# Patient Record
Sex: FEMALE | Race: WHITE | Employment: FULL TIME | URBAN - METROPOLITAN AREA
[De-identification: names, ages, dates, MRNs, and addresses within clinical notes are randomized per-mention and may not be internally consistent; named-entity substitution may affect disease eponyms.]

---

## 2017-10-16 ENCOUNTER — TELEPHONE (OUTPATIENT)
Dept: RHEUMATOLOGY | Facility: CLINIC | Age: 22
End: 2017-10-16

## 2017-10-16 NOTE — TELEPHONE ENCOUNTER
Called patient at [phone] Left a message X1 asking patient to call back regarding the referral we received. An intake form needs to be completed over the phone and records are needed from outside facilities. Awaiting a call back from the patient.  Lisa Montoya CMA  10/16/2017 2:38 PM

## 2017-10-16 NOTE — TELEPHONE ENCOUNTER
----- Message from Alanis Morgan sent at 9/29/2017  3:46 PM CDT -----  Regarding: possible new pt   Contact: [phone]  Pt called in as her provider faxed in referral and records for pt, the provider cannot find out what is going on with pt so she was referred to [facility]. The provider is Skye Richards @ [facility]     Please contact pt @ [phone] once all records are reviewed     Thank you!    Alanis Morgan  Intake representative   Call Center

## 2017-10-17 NOTE — TELEPHONE ENCOUNTER
Emailed release of information form to patient at [email or web address].  Lisa Montoya CMA  10/17/2017 2:39 PM

## 2017-10-17 NOTE — TELEPHONE ENCOUNTER
General Rheumatology Intake Form    1. What is the reason that you are referred to our clinic? Rash on joints from the sun of heat    2. What is the name of the doctor and clinic that referred you to us? Skye Richards at [facility]    3. Have you seen a Rheumatologist in the past?  yes  If yes, what is the name of doctor and clinic? [facility]    4. Have you been diagnosed with Fibromyalgia? No     4. Who is your primary doctor/primary clinic? none    5. Who manages your care for this issue now? none     6. Have you had any labs or imaging done that pertain to the reason that you are coming here? yes If yes, where? [facility]    7. Have you seen any specialist related to the reason you are coming here? yes     8. Where are we expecting records from? [facility]  Lisa Montoya CMA  10/17/2017 2:37 PM

## 2017-11-02 NOTE — TELEPHONE ENCOUNTER
Called patient at [phone] and left a message asking assistance with obtaining her records.  Lisa Montoya CMA  11/2/2017 9:10 AM

## 2017-11-03 NOTE — TELEPHONE ENCOUNTER
Records received and chart is ready for review. Review process takes 1-3 weeks. When we are notified of the decision of the Rheumatologist the patient will be notified.   Lisa Montoya CMA  11/3/2017 3:45 PM

## 2024-01-16 ENCOUNTER — ANESTHESIA EVENT (OUTPATIENT)
Dept: OBGYN | Facility: CLINIC | Age: 29
End: 2024-01-16
Payer: COMMERCIAL

## 2024-01-16 ENCOUNTER — ANESTHESIA (OUTPATIENT)
Dept: OBGYN | Facility: CLINIC | Age: 29
End: 2024-01-16
Payer: COMMERCIAL

## 2024-01-16 ENCOUNTER — HOSPITAL ENCOUNTER (INPATIENT)
Facility: CLINIC | Age: 29
LOS: 1 days | Discharge: HOME HEALTH | End: 2024-01-17
Attending: OBSTETRICS & GYNECOLOGY | Admitting: OBSTETRICS & GYNECOLOGY
Payer: COMMERCIAL

## 2024-01-16 LAB
ABO/RH(D): NORMAL
ANTIBODY SCREEN: NEGATIVE
HGB BLD-MCNC: 12.8 G/DL (ref 11.7–15.7)
SPECIMEN EXPIRATION DATE: NORMAL
T PALLIDUM AB SER QL: NONREACTIVE

## 2024-01-16 PROCEDURE — 370N000003 HC ANESTHESIA WARD SERVICE: Performed by: ANESTHESIOLOGY

## 2024-01-16 PROCEDURE — 722N000001 HC LABOR CARE VAGINAL DELIVERY SINGLE

## 2024-01-16 PROCEDURE — 85018 HEMOGLOBIN: CPT | Performed by: OBSTETRICS & GYNECOLOGY

## 2024-01-16 PROCEDURE — 86780 TREPONEMA PALLIDUM: CPT | Performed by: OBSTETRICS & GYNECOLOGY

## 2024-01-16 PROCEDURE — 10907ZC DRAINAGE OF AMNIOTIC FLUID, THERAPEUTIC FROM PRODUCTS OF CONCEPTION, VIA NATURAL OR ARTIFICIAL OPENING: ICD-10-PCS | Performed by: OBSTETRICS & GYNECOLOGY

## 2024-01-16 PROCEDURE — 3E0R3BZ INTRODUCTION OF ANESTHETIC AGENT INTO SPINAL CANAL, PERCUTANEOUS APPROACH: ICD-10-PCS | Performed by: OBSTETRICS & GYNECOLOGY

## 2024-01-16 PROCEDURE — 0HQ9XZZ REPAIR PERINEUM SKIN, EXTERNAL APPROACH: ICD-10-PCS | Performed by: OBSTETRICS & GYNECOLOGY

## 2024-01-16 PROCEDURE — 250N000011 HC RX IP 250 OP 636: Performed by: ANESTHESIOLOGY

## 2024-01-16 PROCEDURE — 250N000011 HC RX IP 250 OP 636

## 2024-01-16 PROCEDURE — 36415 COLL VENOUS BLD VENIPUNCTURE: CPT | Performed by: OBSTETRICS & GYNECOLOGY

## 2024-01-16 PROCEDURE — 250N000009 HC RX 250: Performed by: OBSTETRICS & GYNECOLOGY

## 2024-01-16 PROCEDURE — 250N000011 HC RX IP 250 OP 636: Performed by: OBSTETRICS & GYNECOLOGY

## 2024-01-16 PROCEDURE — 120N000001 HC R&B MED SURG/OB

## 2024-01-16 PROCEDURE — 00HU33Z INSERTION OF INFUSION DEVICE INTO SPINAL CANAL, PERCUTANEOUS APPROACH: ICD-10-PCS | Performed by: OBSTETRICS & GYNECOLOGY

## 2024-01-16 PROCEDURE — 86900 BLOOD TYPING SEROLOGIC ABO: CPT | Performed by: OBSTETRICS & GYNECOLOGY

## 2024-01-16 PROCEDURE — 258N000003 HC RX IP 258 OP 636: Performed by: OBSTETRICS & GYNECOLOGY

## 2024-01-16 PROCEDURE — 250N000013 HC RX MED GY IP 250 OP 250 PS 637: Performed by: OBSTETRICS & GYNECOLOGY

## 2024-01-16 RX ORDER — NALOXONE HYDROCHLORIDE 0.4 MG/ML
0.4 INJECTION, SOLUTION INTRAMUSCULAR; INTRAVENOUS; SUBCUTANEOUS
Status: DISCONTINUED | OUTPATIENT
Start: 2024-01-16 | End: 2024-01-16 | Stop reason: HOSPADM

## 2024-01-16 RX ORDER — BISACODYL 10 MG
10 SUPPOSITORY, RECTAL RECTAL DAILY PRN
Status: DISCONTINUED | OUTPATIENT
Start: 2024-01-16 | End: 2024-01-17 | Stop reason: HOSPADM

## 2024-01-16 RX ORDER — OXYTOCIN/0.9 % SODIUM CHLORIDE 30/500 ML
340 PLASTIC BAG, INJECTION (ML) INTRAVENOUS CONTINUOUS PRN
Status: DISCONTINUED | OUTPATIENT
Start: 2024-01-16 | End: 2024-01-17 | Stop reason: HOSPADM

## 2024-01-16 RX ORDER — PROCHLORPERAZINE 25 MG
25 SUPPOSITORY, RECTAL RECTAL EVERY 12 HOURS PRN
Status: DISCONTINUED | OUTPATIENT
Start: 2024-01-16 | End: 2024-01-16 | Stop reason: HOSPADM

## 2024-01-16 RX ORDER — ACETAMINOPHEN 325 MG/1
650 TABLET ORAL EVERY 4 HOURS PRN
Status: DISCONTINUED | OUTPATIENT
Start: 2024-01-16 | End: 2024-01-17 | Stop reason: HOSPADM

## 2024-01-16 RX ORDER — METHYLERGONOVINE MALEATE 0.2 MG/ML
200 INJECTION INTRAVENOUS
Status: DISCONTINUED | OUTPATIENT
Start: 2024-01-16 | End: 2024-01-16 | Stop reason: HOSPADM

## 2024-01-16 RX ORDER — OXYTOCIN 10 [USP'U]/ML
10 INJECTION, SOLUTION INTRAMUSCULAR; INTRAVENOUS
Status: DISCONTINUED | OUTPATIENT
Start: 2024-01-16 | End: 2024-01-16 | Stop reason: HOSPADM

## 2024-01-16 RX ORDER — MISOPROSTOL 200 UG/1
400 TABLET ORAL
Status: DISCONTINUED | OUTPATIENT
Start: 2024-01-16 | End: 2024-01-17 | Stop reason: HOSPADM

## 2024-01-16 RX ORDER — PROCHLORPERAZINE MALEATE 10 MG
10 TABLET ORAL EVERY 6 HOURS PRN
Status: DISCONTINUED | OUTPATIENT
Start: 2024-01-16 | End: 2024-01-16 | Stop reason: HOSPADM

## 2024-01-16 RX ORDER — TRANEXAMIC ACID 10 MG/ML
1 INJECTION, SOLUTION INTRAVENOUS EVERY 30 MIN PRN
Status: DISCONTINUED | OUTPATIENT
Start: 2024-01-16 | End: 2024-01-17 | Stop reason: HOSPADM

## 2024-01-16 RX ORDER — HYDROCORTISONE 25 MG/G
CREAM TOPICAL 3 TIMES DAILY PRN
Status: DISCONTINUED | OUTPATIENT
Start: 2024-01-16 | End: 2024-01-17 | Stop reason: HOSPADM

## 2024-01-16 RX ORDER — OXYTOCIN 10 [USP'U]/ML
10 INJECTION, SOLUTION INTRAMUSCULAR; INTRAVENOUS
Status: DISCONTINUED | OUTPATIENT
Start: 2024-01-16 | End: 2024-01-17 | Stop reason: HOSPADM

## 2024-01-16 RX ORDER — KETOROLAC TROMETHAMINE 30 MG/ML
30 INJECTION, SOLUTION INTRAMUSCULAR; INTRAVENOUS
Status: DISCONTINUED | OUTPATIENT
Start: 2024-01-16 | End: 2024-01-16

## 2024-01-16 RX ORDER — CITRIC ACID/SODIUM CITRATE 334-500MG
30 SOLUTION, ORAL ORAL
Status: DISCONTINUED | OUTPATIENT
Start: 2024-01-16 | End: 2024-01-16 | Stop reason: HOSPADM

## 2024-01-16 RX ORDER — METOCLOPRAMIDE HYDROCHLORIDE 5 MG/ML
10 INJECTION INTRAMUSCULAR; INTRAVENOUS EVERY 6 HOURS PRN
Status: DISCONTINUED | OUTPATIENT
Start: 2024-01-16 | End: 2024-01-16 | Stop reason: HOSPADM

## 2024-01-16 RX ORDER — FENTANYL CITRATE-0.9 % NACL/PF 10 MCG/ML
100 PLASTIC BAG, INJECTION (ML) INTRAVENOUS EVERY 5 MIN PRN
Status: DISCONTINUED | OUTPATIENT
Start: 2024-01-16 | End: 2024-01-16 | Stop reason: HOSPADM

## 2024-01-16 RX ORDER — SODIUM CHLORIDE, SODIUM LACTATE, POTASSIUM CHLORIDE, CALCIUM CHLORIDE 600; 310; 30; 20 MG/100ML; MG/100ML; MG/100ML; MG/100ML
INJECTION, SOLUTION INTRAVENOUS CONTINUOUS
Status: DISCONTINUED | OUTPATIENT
Start: 2024-01-16 | End: 2024-01-16 | Stop reason: HOSPADM

## 2024-01-16 RX ORDER — NALOXONE HYDROCHLORIDE 0.4 MG/ML
0.2 INJECTION, SOLUTION INTRAMUSCULAR; INTRAVENOUS; SUBCUTANEOUS
Status: DISCONTINUED | OUTPATIENT
Start: 2024-01-16 | End: 2024-01-16 | Stop reason: HOSPADM

## 2024-01-16 RX ORDER — LIDOCAINE 40 MG/G
CREAM TOPICAL
Status: DISCONTINUED | OUTPATIENT
Start: 2024-01-16 | End: 2024-01-16 | Stop reason: HOSPADM

## 2024-01-16 RX ORDER — LIDOCAINE HYDROCHLORIDE 10 MG/ML
INJECTION, SOLUTION EPIDURAL; INFILTRATION; INTRACAUDAL; PERINEURAL
Status: DISCONTINUED
Start: 2024-01-16 | End: 2024-01-16 | Stop reason: HOSPADM

## 2024-01-16 RX ORDER — OXYTOCIN/0.9 % SODIUM CHLORIDE 30/500 ML
340 PLASTIC BAG, INJECTION (ML) INTRAVENOUS CONTINUOUS PRN
Status: DISCONTINUED | OUTPATIENT
Start: 2024-01-16 | End: 2024-01-16 | Stop reason: HOSPADM

## 2024-01-16 RX ORDER — METOCLOPRAMIDE 10 MG/1
10 TABLET ORAL EVERY 6 HOURS PRN
Status: DISCONTINUED | OUTPATIENT
Start: 2024-01-16 | End: 2024-01-16 | Stop reason: HOSPADM

## 2024-01-16 RX ORDER — MISOPROSTOL 200 UG/1
800 TABLET ORAL
Status: DISCONTINUED | OUTPATIENT
Start: 2024-01-16 | End: 2024-01-16 | Stop reason: HOSPADM

## 2024-01-16 RX ORDER — MISOPROSTOL 200 UG/1
400 TABLET ORAL
Status: DISCONTINUED | OUTPATIENT
Start: 2024-01-16 | End: 2024-01-16 | Stop reason: HOSPADM

## 2024-01-16 RX ORDER — METHYLERGONOVINE MALEATE 0.2 MG/ML
200 INJECTION INTRAVENOUS
Status: DISCONTINUED | OUTPATIENT
Start: 2024-01-16 | End: 2024-01-17 | Stop reason: HOSPADM

## 2024-01-16 RX ORDER — BUPIVACAINE HYDROCHLORIDE 2.5 MG/ML
INJECTION, SOLUTION EPIDURAL; INFILTRATION; INTRACAUDAL PRN
Status: DISCONTINUED | OUTPATIENT
Start: 2024-01-16 | End: 2024-01-16

## 2024-01-16 RX ORDER — DOCUSATE SODIUM 100 MG/1
100 CAPSULE, LIQUID FILLED ORAL DAILY
Status: DISCONTINUED | OUTPATIENT
Start: 2024-01-16 | End: 2024-01-17 | Stop reason: HOSPADM

## 2024-01-16 RX ORDER — TRANEXAMIC ACID 10 MG/ML
1 INJECTION, SOLUTION INTRAVENOUS EVERY 30 MIN PRN
Status: DISCONTINUED | OUTPATIENT
Start: 2024-01-16 | End: 2024-01-16 | Stop reason: HOSPADM

## 2024-01-16 RX ORDER — PRENATAL VIT/IRON FUM/FOLIC AC 27MG-0.8MG
1 TABLET ORAL DAILY
Status: DISCONTINUED | OUTPATIENT
Start: 2024-01-16 | End: 2024-01-17 | Stop reason: HOSPADM

## 2024-01-16 RX ORDER — MODIFIED LANOLIN
OINTMENT (GRAM) TOPICAL
Status: DISCONTINUED | OUTPATIENT
Start: 2024-01-16 | End: 2024-01-17 | Stop reason: HOSPADM

## 2024-01-16 RX ORDER — ONDANSETRON 4 MG/1
4 TABLET, ORALLY DISINTEGRATING ORAL EVERY 6 HOURS PRN
Status: DISCONTINUED | OUTPATIENT
Start: 2024-01-16 | End: 2024-01-16 | Stop reason: HOSPADM

## 2024-01-16 RX ORDER — IBUPROFEN 800 MG/1
800 TABLET, FILM COATED ORAL
Status: DISCONTINUED | OUTPATIENT
Start: 2024-01-16 | End: 2024-01-16

## 2024-01-16 RX ORDER — CARBOPROST TROMETHAMINE 250 UG/ML
250 INJECTION, SOLUTION INTRAMUSCULAR
Status: DISCONTINUED | OUTPATIENT
Start: 2024-01-16 | End: 2024-01-17 | Stop reason: HOSPADM

## 2024-01-16 RX ORDER — ACETAMINOPHEN 325 MG/1
650 TABLET ORAL EVERY 4 HOURS PRN
Status: DISCONTINUED | OUTPATIENT
Start: 2024-01-16 | End: 2024-01-16 | Stop reason: HOSPADM

## 2024-01-16 RX ORDER — MISOPROSTOL 200 UG/1
800 TABLET ORAL
Status: DISCONTINUED | OUTPATIENT
Start: 2024-01-16 | End: 2024-01-17 | Stop reason: HOSPADM

## 2024-01-16 RX ORDER — NALBUPHINE HYDROCHLORIDE 20 MG/ML
2.5-5 INJECTION, SOLUTION INTRAMUSCULAR; INTRAVENOUS; SUBCUTANEOUS EVERY 6 HOURS PRN
Status: DISCONTINUED | OUTPATIENT
Start: 2024-01-16 | End: 2024-01-16

## 2024-01-16 RX ORDER — OXYTOCIN/0.9 % SODIUM CHLORIDE 30/500 ML
100-340 PLASTIC BAG, INJECTION (ML) INTRAVENOUS CONTINUOUS PRN
Status: DISCONTINUED | OUTPATIENT
Start: 2024-01-16 | End: 2024-01-16

## 2024-01-16 RX ORDER — FENTANYL/BUPIVACAINE/NS/PF 2-1250MCG
PLASTIC BAG, INJECTION (ML) INJECTION
Status: COMPLETED
Start: 2024-01-16 | End: 2024-01-16

## 2024-01-16 RX ORDER — CARBOPROST TROMETHAMINE 250 UG/ML
250 INJECTION, SOLUTION INTRAMUSCULAR
Status: DISCONTINUED | OUTPATIENT
Start: 2024-01-16 | End: 2024-01-16 | Stop reason: HOSPADM

## 2024-01-16 RX ORDER — OXYTOCIN 10 [USP'U]/ML
10 INJECTION, SOLUTION INTRAMUSCULAR; INTRAVENOUS
Status: DISCONTINUED | OUTPATIENT
Start: 2024-01-16 | End: 2024-01-16

## 2024-01-16 RX ORDER — IBUPROFEN 800 MG/1
800 TABLET, FILM COATED ORAL EVERY 6 HOURS PRN
Status: DISCONTINUED | OUTPATIENT
Start: 2024-01-16 | End: 2024-01-17 | Stop reason: HOSPADM

## 2024-01-16 RX ORDER — FENTANYL CITRATE 50 UG/ML
100 INJECTION, SOLUTION INTRAMUSCULAR; INTRAVENOUS
Status: DISCONTINUED | OUTPATIENT
Start: 2024-01-16 | End: 2024-01-16 | Stop reason: HOSPADM

## 2024-01-16 RX ORDER — ONDANSETRON 2 MG/ML
4 INJECTION INTRAMUSCULAR; INTRAVENOUS EVERY 6 HOURS PRN
Status: DISCONTINUED | OUTPATIENT
Start: 2024-01-16 | End: 2024-01-16 | Stop reason: HOSPADM

## 2024-01-16 RX ADMIN — Medication: at 08:35

## 2024-01-16 RX ADMIN — SODIUM CHLORIDE, POTASSIUM CHLORIDE, SODIUM LACTATE AND CALCIUM CHLORIDE 125 ML/HR: 600; 310; 30; 20 INJECTION, SOLUTION INTRAVENOUS at 09:46

## 2024-01-16 RX ADMIN — ACETAMINOPHEN 650 MG: 325 TABLET, FILM COATED ORAL at 21:35

## 2024-01-16 RX ADMIN — Medication 340 ML/HR: at 11:14

## 2024-01-16 RX ADMIN — ACETAMINOPHEN 650 MG: 325 TABLET, FILM COATED ORAL at 17:35

## 2024-01-16 RX ADMIN — KETOROLAC TROMETHAMINE 30 MG: 30 INJECTION, SOLUTION INTRAMUSCULAR; INTRAVENOUS at 12:19

## 2024-01-16 RX ADMIN — IBUPROFEN 800 MG: 800 TABLET, FILM COATED ORAL at 19:30

## 2024-01-16 RX ADMIN — SODIUM CHLORIDE, POTASSIUM CHLORIDE, SODIUM LACTATE AND CALCIUM CHLORIDE 1000 ML: 600; 310; 30; 20 INJECTION, SOLUTION INTRAVENOUS at 08:10

## 2024-01-16 RX ADMIN — BUPIVACAINE HYDROCHLORIDE 10 ML: 2.5 INJECTION, SOLUTION EPIDURAL; INFILTRATION; INTRACAUDAL at 08:35

## 2024-01-16 ASSESSMENT — ACTIVITIES OF DAILY LIVING (ADL)
ADLS_ACUITY_SCORE: 18

## 2024-01-16 NOTE — ANESTHESIA PROCEDURE NOTES
Epidural catheter Procedure Note    Pre-Procedure   Staff -        Anesthesiologist:  Xavi Arriola MD       Performed By: anesthesiologist       Referred By: steff       Location: OB       Pre-Anesthestic Checklist: patient identified, IV checked, risks and benefits discussed, informed consent, monitors and equipment checked, pre-op evaluation and at physician/surgeon's request  Timeout:       Correct Patient: Yes        Correct Procedure: Yes        Correct Site: Yes        Correct Position: Yes   Procedure Documentation  Procedure: epidural catheter       Diagnosis: labor       Patient Position: sitting       Patient Prep/Sterile Barriers: sterile gloves, mask, patient draped       Skin prep: Betadine       Local skin infiltrated with 3 mL of 1% lidocaine.        Insertion Site: L3-4. (midline approach).       Technique: LORT saline        ANDI at 5.5 cm.       Needle Type: [place] needle       Needle Gauge: 17.        Needle Length (Inches): 3.5        Catheter: 19 G.          Catheter threaded easily.           Threaded 11 cm at skin.         # of attempts: 1 and  # of redirects:  0    Assessment/Narrative         Paresthesias: No.       Test dose of 3 mL lidocaine 1.5% w/ 1:200,000 epinephrine at.         Test dose negative, 3 minutes after injection, for signs of intravascular, subdural, or intrathecal injection.       Insertion/Infusion Method: LORT saline       Aspiration negative for Heme or CSF via Epidural Catheter.     Comments:  Procedure tolerated well without apparent complications. Repeat aspiration negative for CSF. Initial MDA bolus of 0.25% bupivacaine was incrementally given without difficulty or complications. No abrupt changes in sensation or hemodynamic instability.  Epidural infusion (0.125% bupi with fentanyl 2mcg/ml) started at 10 ml/hr with PCEA of 5 ml q15min with a max cumulative dose of 25 ml/hr. PCEA instructions given and use encouraged PRN. Epidural expectations again  "reviewed and questions answered. Patient hemodynamically stable.  Epidural functionality to be reassessed later via vital sign flowsheet, nursing communication, and/or patient report.  Anesthesiologist immediately available for ongoing assessment and management.            FOR [facility] ([facility] ONLY:   Pain Team Contact information: please page the Pain Team Via Mobile[employer]. Search \"Pain\". During daytime hours, please page the attending first. At night please page the resident first.      "

## 2024-01-16 NOTE — CARE PLAN
Approximately 0125-1550 epidural meds removed on override, MDA busy is c section & pt arrived at 6cm rating pain 8-10 requesting epidural.  .

## 2024-01-16 NOTE — PROVIDER NOTIFICATION
01/16/24 1050   Provider Notification   Provider Name/Title steff   Method of Notification At Bedside   Request Attend Delivery     Called to bedside prolonged with pushing

## 2024-01-16 NOTE — H&P
[facility]     History and Physical  Obstetrics and Gynecology     Date of Admission:  2024    History of Present Illness   Nadiya Garcia is a 28 year old female  40w0d with Estimated Date of Delivery: 2024 who presents with contractions and active labor    [facility].    PRENATAL COURSE  Prenatal care was received at [facility] and the  course was essentially uncomplicated      No lab results found.  Rhogam not indicated   No lab results found.    Past Medical History    I have reviewed this patient's medical history and updated it with pertinent information if needed.   Past Medical History:   Diagnosis Date    Amenorrhea, primary 2012    work up currently with PCP    Photoallergic dermatitis 2012    Underweight 2012    <5% BMI    Vesicoureteral reflux with reflux nephropathy, unilateral 3/2004    Febrile UTI  - normal renal ultrasound and minimal left reflux on last nuclear VCUG       Past Surgical History   I have reviewed this patient's surgical history and updated it with pertinent information if needed.  History reviewed. No pertinent surgical history.    Prior to Admission Medications   Prior to Admission Medications   Prescriptions Last Dose Informant Patient Reported? Taking?   Prenatal Vit-Fe Fumarate-FA (PRENATAL MULTIVITAMIN  PLUS IRON) 27-1 MG TABS 1/15/2024  Yes Yes   Sig: Take by mouth daily   adapalene (DIFFERIN) 0.1 % cream   Yes No   Sig: Apply topically At Bedtime   clindamycin-benzoyl peroxide (BENZACLIN) gel   No No   Sig: Apply topically 2 times daily Over affected areas of the face   tretinoin (RETIN-A) 0.05 % cream   No No   Sig: Spread a pea size amount into 4 quadrants of your face topically at bedtime.      Facility-Administered Medications: None     Allergies   Allergies   Allergen Reactions    Pcn [Penicillins] Rash       Social History   I have reviewed this  patient's social history and updated it with pertinent information if needed. Nadiya Garcia  reports that she has never smoked. She does not have any smokeless tobacco history on file. She reports that she does not drink alcohol and does not use drugs.    Family History   Family history reviewed with patient and is noncontributory.    Immunization History   Immunizations are up to date    Physical Exam   Temp: 98.3  F (36.8  C) Temp src: Oral BP: 118/67 Pulse: 80   Resp: 16        Vital Signs with Ranges  Temp:  [98.3  F (36.8  C)] 98.3  F (36.8  C)  Pulse:  [80] 80  Resp:  [16] 16  BP: (118)/(67) 118/67  Fetal Heart Tones: 140 baseline, moderate variablility and variables/early decel  TOCO: frequency q 3-5 minutes    Constitutional: healthy, alert, and active   Respiratory: No increased work of breathing, good air exchange, clear to auscultation bilaterally, no crackles or wheezing  Cardiovascular: Normal apical impulse, regular rate and rhythm, normal S1 and S2, no S3 or S4, and no murmur noted  Abdomen: gravid, single vertex fetus, non-tender, EFW 7 lbs 0  Cervical Exam: 6.5/ 90/ Mid/ soft/ 0       Assessment & Plan   Nadiya Garcia is a 28 year old female  who presents at 40w0d with active labor.   GBS negative.        Admit - see IP orders  Pain medication desires epidural   Anticipate         Prenatal Care:  -OB labs reviewed: A positive, Rubella immune, HIV neg, Heb B nonreactive, Heb B immune, RPR negative  -Genetics: NIPS normal, AFP normal, XX!  -Anatomy ultrasound: Myla US normal, anterior placenta   -Rh positive, Rhogam not indicated  -GCT 89 at 28 weeks along with Hgb of 11.4  - S/p flu, Tdap and RSV  - GBS negative  - Feed: Breast, Rx previously sent  - Contraception: Pills  - Continue PNV     Lindsey Weems, , DO

## 2024-01-16 NOTE — CARE PLAN
Data: Nadiya Garcia transferred to [facility] via wheelchair at 1400. Baby transferred via parent's arms.  Action: Receiving unit notified of transfer: Yes. Patient and family notified of room change. Report given to Marlee at 1410. Belongings sent to receiving unit. Accompanied by Registered Nurse. Oriented patient to surroundings. Call light within reach. ID bands double-checked with receiving RN.  Patient tolerated transfer and is stable.

## 2024-01-16 NOTE — PROVIDER NOTIFICATION
01/16/24 1014   Provider Notification   Provider Name/Title Dank   Method of Notification At Bedside   Notification Reason Status Update     AROM clear fluid

## 2024-01-16 NOTE — CARE PLAN
0730-  here @ 40 weeks with reports of UC q 2-3 min starting @ 0200 today. Denies any LOF or VB, states baby is active however less than normal. Pt was 3cm at clinic yesterday. Denies any S&S of preE. Monitors applied

## 2024-01-16 NOTE — ANESTHESIA PREPROCEDURE EVALUATION
Anesthesia Pre-Procedure Evaluation    Patient: Nadiya Garcia   MRN: [id number] : 1995        Procedure :           Past Medical History:   Diagnosis Date    Amenorrhea, primary 2012    work up currently with PCP    Photoallergic dermatitis 2012    Underweight 2012    <5% BMI    Vesicoureteral reflux with reflux nephropathy, unilateral 3/2004    Febrile UTI  - normal renal ultrasound and minimal left reflux on last nuclear VCUG      History reviewed. No pertinent surgical history.   Allergies   Allergen Reactions    Pcn [Penicillins] Rash      Social History     Tobacco Use    Smoking status: Never    Smokeless tobacco: Not on file   Substance Use Topics    Alcohol use: No      Wt Readings from Last 1 Encounters:   12 46.2 kg (101 lb 14.4 oz) (11%, Z= -1.22)*     * Growth percentiles are based on [facility] [place]Girls, 2-20 Years) data.        Anesthesia Evaluation            ROS/MED HX  ENT/Pulmonary:  - neg pulmonary ROS     Neurologic:  - neg neurologic ROS     Cardiovascular:  - neg cardiovascular ROS     METS/Exercise Tolerance:     Hematologic:  - neg hematologic  ROS     Musculoskeletal:  - neg musculoskeletal ROS     GI/Hepatic:  - neg GI/hepatic ROS     Renal/Genitourinary:     (+) renal disease,             Endo:  - neg endo ROS     Psychiatric/Substance Use:  - neg psychiatric ROS     Infectious Disease:       Malignancy:       Other:            Physical Exam    Airway             Respiratory Devices and Support         Dental           Cardiovascular          Rhythm and rate: regular and normal     Pulmonary   pulmonary exam normal                OUTSIDE LABS:  CBC:   Lab Results   Component Value Date    WBC 5.3 2013    WBC 5.6 2012    HGB 12.8 2024    HGB 13.9 2013    HCT 40.5 2013    HCT 41.1 2012     2013     2012     BMP:   Lab Results   Component Value Date     (H) 2013     (H) 2012    POTASSIUM 4.2  "08/05/2013    POTASSIUM 3.5 04/09/2012    CHLORIDE 104 08/05/2013    CHLORIDE 104 04/09/2012    CO2 25 08/05/2013    CO2 30 04/09/2012    BUN 19 08/05/2013    BUN 13 04/09/2012    CR 0.66 08/05/2013    CR 0.63 04/09/2012     (H) 08/05/2013     (H) 04/09/2012     COAGS: No results found for: \"PTT\", \"INR\", \"FIBR\"  POC: No results found for: \"BGM\", \"HCG\", \"HCGS\"  HEPATIC:   Lab Results   Component Value Date    ALBUMIN 4.6 08/05/2013    PROTTOTAL 8.0 08/05/2013    ALT 23 08/05/2013    AST 27 08/05/2013    ALKPHOS 79 08/05/2013    BILITOTAL 0.3 08/05/2013     OTHER:   Lab Results   Component Value Date    KELSI 9.7 08/05/2013    TSH 3.73 04/09/2012    CRP <5.0 05/25/2012    SED 10 05/25/2012       Anesthesia Plan    ASA Status:  2    - Procedure: Procedure only, no anesthetic delivered      Anesthesia Type: Epidural.              Consents    Anesthesia Plan(s) and associated risks, benefits, and realistic alternatives discussed. Questions answered and patient/representative(s) expressed understanding.     - Discussed:     - Discussed with:  Patient            Postoperative Care            Comments:    Other Comments: Continuous Labor Epidural: Indication is for labor pain. Following a discussion of the procedure, epidural expectations, and risks and benefits (risks including, but not limited to, nerve damage, infection, bleeding/hematoma, inadvertent dural puncture, spinal headache, partial or failed block possibly requiring epidural replacement), the patient appears to understand and consents to proceed. Questions were encouraged and answered prior to placement.              Xavi Arriola MD                      "

## 2024-01-16 NOTE — DISCHARGE SUMMARY
"[facility]    Discharge Summary  Obstetrics    Date of Admission:  2024  Date of Discharge:  2024  Discharging Provider: Beverly Byrd MD    Discharge Diagnoses         History of Present Illness   Nadiya Garcia is a 28 year old female now  who presented to L&D @ 40w0d in active labor. Her pregnancy has been uncomplicated. Please see her admit H&P for full details of her PMH, PSH, Meds, Allergies and exam on admit.    Hospital Course   The patient had a Normal spontaneous vaginal delivery @ 40w0d, please see her delivery summary for full details.     Her postpartum course was uncomplicated. On postpartum day 1, she was meeting all of her postpartum goals and deemed stable for discharge. She was voiding without difficulty, tolerating a regular diet without nausea and vomiting, her pain was well controlled on oral pain medicines and her lochia was appropriate.    Hgb:   Lab Results   Component Value Date    HGB 12.8 2024    HGB 13.9 2013    HGB 13.7 2012       No results found for: \"RH\" and rhogam was not given    Contraception was discussed and will be addressed at her postpartum appointment.    Instructions:  1) Call for temperature greater than 100.4F, foul smelling vaginal discharge, bleeding more than 1 pad per hour for 2 hrs, pain not controlled by oral pain meds, severe constipation or severe nausea or vomiting.  2) She was instructed to follow-up with her primary OB in 6 weeks for a routine postpartum visit  3) She was instructed to continue her PNV on discharge if she wished to breast feed her infant.    Dr. Byrd    Discharge Disposition   Discharged to home   Condition at discharge: Stable    Primary Care Physician   Brianda Jain    Consultations This Hospital Stay   LACTATION IP CONSULT    Discharge Orders      Breast pump - Manual/Electric    Breast Pump Documentation:  Manual/Electric Pump: To support adequate breast milk " production and nutrition for infant.     I, the undersigned, certify that the above prescribed supplies are medically necessary for this patient and is both reasonable and necessary in reference to accepted standards of medical and necessary in reference to accepted standards of medical practice in the treatment of this patient's condition and is not prescribed as a convenience.     Discharge Medications   Current Discharge Medication List        CONTINUE these medications which have NOT CHANGED    Details   Prenatal Vit-Fe Fumarate-FA (PRENATAL MULTIVITAMIN  PLUS IRON) 27-1 MG TABS Take by mouth daily      adapalene (DIFFERIN) 0.1 % cream Apply topically At Bedtime      clindamycin-benzoyl peroxide (BENZACLIN) gel Apply topically 2 times daily Over affected areas of the face  Qty: 50 g, Refills: 11    Associated Diagnoses: Acne vulgaris      tretinoin (RETIN-A) 0.05 % cream Spread a pea size amount into 4 quadrants of your face topically at bedtime.  Qty: 45 g, Refills: 11    Associated Diagnoses: Acne vulgaris           Allergies   Allergies   Allergen Reactions    Pcn [Penicillins] Rash

## 2024-01-16 NOTE — L&D DELIVERY NOTE
Nadiya Garcia is a 28 year old-year-old  female,  1 para 0 with  EDC 24, who was admitted for active labor at 40 weeks gestation.    Her prenatal care was at the [facility] in [place]. Prenatal course was uncomplicated. Vaginal Group B Streptococcus culture was negative.  Patient underwent artificial rupture of membranes at 1014, yielding clear fluid.  Patient received an epidural/narcotic injection for pain relief.  The patient achieved complete dilation. She went on to deliver a 7 #, 13 oz female infant at 1110 by . Apgars were  9 at one minute and 9 at five minutes. The fetal oropharynx was bulb suctioned on the perineum.  There was no nuchal cord. The placenta delivered spontaneously and intact at 1113.  The patient had a left labia and bilateral vaginal with apex before (not extending into perineum). This was repaired with #3-0 vicryl for all.  EBL for the delivery was 403.   Dr. Lindsey Weems DO

## 2024-01-16 NOTE — PROVIDER NOTIFICATION
01/16/24 0743   Provider Notification   Notification Reason Status Update     Updated on pt arrival, SVE and move to [facility] for labor, pt requesting epidural

## 2024-01-17 VITALS
OXYGEN SATURATION: 100 % | HEART RATE: 84 BPM | RESPIRATION RATE: 18 BRPM | TEMPERATURE: 98 F | HEIGHT: 71 IN | SYSTOLIC BLOOD PRESSURE: 105 MMHG | DIASTOLIC BLOOD PRESSURE: 59 MMHG

## 2024-01-17 LAB — HGB BLD-MCNC: 11.2 G/DL (ref 11.7–15.7)

## 2024-01-17 PROCEDURE — 85018 HEMOGLOBIN: CPT | Performed by: OBSTETRICS & GYNECOLOGY

## 2024-01-17 PROCEDURE — 999N000082 HC STATISTIC IP LACTATION SERVICES 46-60 MIN

## 2024-01-17 PROCEDURE — 250N000013 HC RX MED GY IP 250 OP 250 PS 637: Performed by: OBSTETRICS & GYNECOLOGY

## 2024-01-17 PROCEDURE — 36415 COLL VENOUS BLD VENIPUNCTURE: CPT | Performed by: OBSTETRICS & GYNECOLOGY

## 2024-01-17 RX ORDER — IBUPROFEN 800 MG/1
800 TABLET, FILM COATED ORAL EVERY 6 HOURS PRN
Qty: 40 TABLET | Refills: 1 | Status: SHIPPED | OUTPATIENT
Start: 2024-01-17

## 2024-01-17 RX ORDER — DOCUSATE SODIUM 100 MG/1
100 CAPSULE, LIQUID FILLED ORAL DAILY
Qty: 30 CAPSULE | Refills: 0 | Status: SHIPPED | OUTPATIENT
Start: 2024-01-18

## 2024-01-17 RX ORDER — MODIFIED LANOLIN
OINTMENT (GRAM) TOPICAL
Qty: 7 G | Refills: 3 | Status: SHIPPED | OUTPATIENT
Start: 2024-01-17

## 2024-01-17 RX ORDER — ACETAMINOPHEN 325 MG/1
650 TABLET ORAL EVERY 4 HOURS PRN
Qty: 40 TABLET | Refills: 1 | Status: SHIPPED | OUTPATIENT
Start: 2024-01-17

## 2024-01-17 RX ORDER — HYDROCORTISONE 25 MG/G
CREAM TOPICAL 3 TIMES DAILY PRN
Qty: 30 G | Refills: 0 | Status: SHIPPED | OUTPATIENT
Start: 2024-01-17

## 2024-01-17 RX ADMIN — ACETAMINOPHEN 650 MG: 325 TABLET, FILM COATED ORAL at 11:03

## 2024-01-17 RX ADMIN — IBUPROFEN 800 MG: 800 TABLET, FILM COATED ORAL at 08:50

## 2024-01-17 RX ADMIN — DOCUSATE SODIUM 100 MG: 100 CAPSULE, LIQUID FILLED ORAL at 08:50

## 2024-01-17 RX ADMIN — ACETAMINOPHEN 650 MG: 325 TABLET, FILM COATED ORAL at 05:58

## 2024-01-17 RX ADMIN — ACETAMINOPHEN 650 MG: 325 TABLET, FILM COATED ORAL at 01:36

## 2024-01-17 RX ADMIN — IBUPROFEN 800 MG: 800 TABLET, FILM COATED ORAL at 01:35

## 2024-01-17 ASSESSMENT — ACTIVITIES OF DAILY LIVING (ADL)
ADLS_ACUITY_SCORE: 18

## 2024-01-17 NOTE — PROGRESS NOTES
"[facility] Postpartum Note    S:  Nadiya Garcia feels well this morning. Was able to sleep last night. Pain control adequate. Lochia minimal. Voiding. Breastfeeding. Mood Good.     O:  Vitals were reviewed  Wt Readings from Last 4 Encounters:   05/25/12 46.2 kg (101 lb 14.4 oz) (11%, Z= -1.22)*     * Growth percentiles are based on CDC (Girls, 2-20 Years) data.       General: healthy, alert, and no distress  Abd: soft, appropriately tender, fundus firm  Legs: Non-tender, 0+ pitting edema    No results found for: \"RH\"  Rubella: immune    Assessment and Plan:   Postpartum Day #1, status post vaginal delivery, doing well.  -- Discharge home  -- F/U 6 weeks w/ Primary OB  -- Discharge meds: see med rec  -- Contraception: considering    ABLA  -mild, stable 11.2    Beverly Byrd MD  "

## 2024-01-17 NOTE — ANESTHESIA POSTPROCEDURE EVALUATION
Patient: Nadiya Garcia    Procedure: * No procedures listed *       Anesthesia Type:  Epidural    Note:     Postop Pain Control: Uneventful            Sign Out: Well controlled pain   PONV: No   Neuro/Psych: Uneventful            Sign Out: Acceptable/Baseline neuro status   Airway/Respiratory:             Sign Out: Acceptable/Baseline resp. status   CV/Hemodynamics:             Sign Out: Acceptable CV status   Other NRE:    DID A NON-ROUTINE EVENT OCCUR?     Event details/Postop Comments:  .Labor Epidural Post delivery note.      Doing well. VSS Temp normal. Satisfactory respiratory and cardiovascular function. Return of neurologic function. Questions encouraged and answered. Denies positional headache. Minimal side effects easily managed w/ PRN meds. No apparent anesthetic complications. No follow-up required.    I or my partner was immediately available for epidural management.    CHRISTOPHE José                 Last vitals:  Vitals:    01/17/24 0131 01/17/24 0555 01/17/24 1105   BP: 104/63 105/68 105/59   Pulse: 86 73 84   Resp: 16 20 18   Temp: 98  F (36.7  C) 97.6  F (36.4  C) 98  F (36.7  C)   SpO2:          Electronically Signed By: Rocky José DO  January 17, 2024  11:46 AM

## 2024-01-17 NOTE — PLAN OF CARE
Goal Outcome Evaluation:    VSS. Up independently. Eating & drinking normally. Pain controlled with tylenol & ibuprofen. Bonding well with baby girl- Selma.  supportive @ bedside. Breastfeeding baby with some assistance about every 2-3 hours. Fundus firm @ midline. Denies preeclampsia symptoms. Voiding adequately.

## 2024-01-17 NOTE — DISCHARGE INSTRUCTIONS

## 2024-01-17 NOTE — PLAN OF CARE
Vital signs stable. Postpartum checks within defined limits. Voiding spontaneously. Ambulating independently, denies dizziness. Reports pain is manageable with tylenol and ibuprofen. Breastfeeding every 2-3 hours, latch observed. Attentive to  cues, bonding well.

## 2024-01-17 NOTE — PLAN OF CARE
Discharge instructions completed.  Patient states she understands all discharge instructions and all her questions have been answered.  Verbalizes when she needs to return to clinic for follow up for herself and baby.  She is caring for herself and her baby independently.  Prescriptions reviewed and sent to pharmacy.  Postpartum depression symptoms reviewed and encouraged frequent review of depression scale. Waiting to see lactation RN, then will be discharging.

## 2024-01-17 NOTE — LACTATION NOTE
Lactation Visit: Writer went in to see Nadiya parents are hoping to discharge today and wanted to see Lactation prior to discharging. Infant has not been waking up to feed and has been going 4+ hours in between feeds. Infant had last fed at 0900. Infant was fully clothed in basinet when writer entered the room. Educated parents on ways to help infant wake up for feeding. Placed infant STS and had mother hand express colostrum and to finger feeding infant. After approximately 15 mins infant aroused enough to begin feeding.  Nadiya stated she was feeding on one side for 20-30 mins and then stopping, encouraged mother to try to feed infant on both sides. Nadiya's nipples are sore and tender and she complains of infant pinching her when feeding. Infant has a very chompy suck and shallow latch. Discussed techniques to assist with infant opening mouth wider and achieving a better latch. After some discussion Nadiya agreed to try the football hold. Infant was able to achieve a better latch and some swallows were heard. After discussion and looking back at the feeding log encouraged parents to consider staying for feeding assistance over night or at a minimum for another feeding or two to ensure infant was eating better and more frequently. Reviewed the importance of frequent breast stimulation, hand expression and possible pumping. Lactation resources sheet given. Primary RN updated on feeding  and discussion. Pt declined to stay for additional feedings. Home health nurse is scheduled to come out tomorrow.

## 2024-04-14 ENCOUNTER — HEALTH MAINTENANCE LETTER (OUTPATIENT)
Age: 29
End: 2024-04-14

## 2025-04-19 ENCOUNTER — HEALTH MAINTENANCE LETTER (OUTPATIENT)
Age: 30
End: 2025-04-19